# Patient Record
Sex: MALE | URBAN - METROPOLITAN AREA
[De-identification: names, ages, dates, MRNs, and addresses within clinical notes are randomized per-mention and may not be internally consistent; named-entity substitution may affect disease eponyms.]

---

## 2022-09-20 ENCOUNTER — OFFICE VISIT (OUTPATIENT)
Dept: URBAN - METROPOLITAN AREA TELEHEALTH 2 | Facility: TELEHEALTH | Age: 22
End: 2022-09-20
Payer: COMMERCIAL

## 2022-09-20 ENCOUNTER — OFFICE VISIT (OUTPATIENT)
Dept: URBAN - METROPOLITAN AREA CLINIC 96 | Facility: CLINIC | Age: 22
End: 2022-09-20

## 2022-09-20 DIAGNOSIS — Z79.899 LONG-TERM USE OF IMMUNOSUPPRESSANT MEDICATION: ICD-10-CM

## 2022-09-20 DIAGNOSIS — K50.00 CROHN'S DISEASE OF SMALL INTESTINE WITHOUT COMPLICATION: ICD-10-CM

## 2022-09-20 DIAGNOSIS — R21 RASH AND NONSPECIFIC SKIN ERUPTION: ICD-10-CM

## 2022-09-20 PROBLEM — 56689002 CROHN'S DISEASE OF SMALL INTESTINE: Status: ACTIVE | Noted: 2022-09-20

## 2022-09-20 PROCEDURE — 99204 OFFICE O/P NEW MOD 45 MIN: CPT | Performed by: INTERNAL MEDICINE

## 2022-09-20 NOTE — HPI-TODAY'S VISIT:
Chico Block is a 20 y/o with ileal CD, currently on Avsola (5mg/kg every 7 weeks). . Pt is referred by Dr. Mendoza. . Pt was dxd with CD in 2013.  He was started on Remicade (started in 2013) and then change to Avsola.  He was started on MTX due to some joint pain, given by Rheum (he uses autoinjector). Last colonoscopy in 2021. . Today on 9/20/2022, pt reports that he has no joint pain, no diarrhea, no weight loss, no n/v.  Some increase dandruff and itchy scalp.  No hair loss.

## 2022-11-11 ENCOUNTER — TELEPHONE ENCOUNTER (OUTPATIENT)
Dept: URBAN - METROPOLITAN AREA CLINIC 96 | Facility: CLINIC | Age: 22
End: 2022-11-11

## 2023-02-16 ENCOUNTER — TELEPHONE ENCOUNTER (OUTPATIENT)
Dept: URBAN - METROPOLITAN AREA CLINIC 96 | Facility: CLINIC | Age: 23
End: 2023-02-16

## 2023-04-06 ENCOUNTER — TELEPHONE ENCOUNTER (OUTPATIENT)
Dept: URBAN - METROPOLITAN AREA CLINIC 96 | Facility: CLINIC | Age: 23
End: 2023-04-06

## 2023-06-15 ENCOUNTER — WEB ENCOUNTER (OUTPATIENT)
Dept: URBAN - METROPOLITAN AREA CLINIC 96 | Facility: CLINIC | Age: 23
End: 2023-06-15

## 2023-07-19 ENCOUNTER — LAB OUTSIDE AN ENCOUNTER (OUTPATIENT)
Dept: URBAN - METROPOLITAN AREA CLINIC 96 | Facility: CLINIC | Age: 23
End: 2023-07-19

## 2023-07-19 ENCOUNTER — OFFICE VISIT (OUTPATIENT)
Dept: URBAN - METROPOLITAN AREA CLINIC 96 | Facility: CLINIC | Age: 23
End: 2023-07-19
Payer: COMMERCIAL

## 2023-07-19 ENCOUNTER — DASHBOARD ENCOUNTERS (OUTPATIENT)
Age: 23
End: 2023-07-19

## 2023-07-19 ENCOUNTER — WEB ENCOUNTER (OUTPATIENT)
Dept: URBAN - METROPOLITAN AREA CLINIC 96 | Facility: CLINIC | Age: 23
End: 2023-07-19

## 2023-07-19 VITALS
HEART RATE: 74 BPM | TEMPERATURE: 97.2 F | SYSTOLIC BLOOD PRESSURE: 111 MMHG | DIASTOLIC BLOOD PRESSURE: 74 MMHG | WEIGHT: 166.2 LBS | HEIGHT: 72 IN | BODY MASS INDEX: 22.51 KG/M2

## 2023-07-19 DIAGNOSIS — K50.80 CROHN'S COLITIS: ICD-10-CM

## 2023-07-19 DIAGNOSIS — R21 RASH AND NONSPECIFIC SKIN ERUPTION: ICD-10-CM

## 2023-07-19 PROCEDURE — 99214 OFFICE O/P EST MOD 30 MIN: CPT | Performed by: INTERNAL MEDICINE

## 2023-07-19 RX ORDER — INFLIXIMAB 100 MG/10ML
AS DIRECTED INJECTION, POWDER, LYOPHILIZED, FOR SOLUTION INTRAVENOUS
Qty: 100 | Refills: 0 | OUTPATIENT
Start: 2023-07-19 | End: 2023-08-18

## 2023-07-19 NOTE — PHYSICAL EXAM PSYCH:
normal mood with appropriate affect · Per history provided by son    Patient has had previous inpatient psychiatric stay in [place]

## 2023-07-19 NOTE — HPI-TODAY'S VISIT:
Chico Block is a 21 y/o with ileal CD, currently on Avsola (5mg/kg every 7 weeks). . Pt is referred by Dr. Mendoza. . Pt was dxd with CD in 2013.  He was started on Remicade (started in 2013) and then change to Avsola.  He was started on MTX due to some joint pain, given by Rheum (he uses autoinjector). Last colonoscopy in 2021. . Previously on 9/20/2022, pt reports that he has no joint pain, no diarrhea, no weight loss, no n/v.  Some increase dandruff and itchy scalp.  No hair loss. . Today on 7/19/2023, pt reports that he graduated from [employer], looking for a job.  Scalp issue is resolved with a new medication. Having some HA after going to the beach.

## 2023-08-11 LAB
A/G RATIO: 1.6
ABSOLUTE BASOPHILS: 42
ABSOLUTE EOSINOPHILS: 132
ABSOLUTE LYMPHOCYTES: 2718
ABSOLUTE MONOCYTES: 528
ABSOLUTE NEUTROPHILS: 2580
ALBUMIN: 4.9
ALKALINE PHOSPHATASE: 58
ALT (SGPT): 19
AST (SGOT): 17
BASOPHILS: 0.7
BILIRUBIN, TOTAL: 0.4
BUN/CREATININE RATIO: (no result)
BUN: 10
CALCIUM: 10
CARBON DIOXIDE, TOTAL: 24
CHLORIDE: 101
COMMENT: (no result)
CREATININE: 0.9
EGFR: 124
EOSINOPHILS: 2.2
GLOBULIN, TOTAL: 3.1
GLUCOSE: 92
HEMATOCRIT: 47.5
HEMOGLOBIN: 15.5
INFLIXIMAB AB, IBD: 22
INFLIXIMAB DRUG LEVEL: 9.1
INTERPRETATION: (no result)
LYMPHOCYTES: 45.3
MCH: 29.2
MCHC: 32.6
MCV: 89.6
MITOGEN-NIL: 9.57
MONOCYTES: 8.8
MPV: 9.5
NEUTROPHILS: 43
PLATELET COUNT: 331
POTASSIUM: 4.3
PROTEIN, TOTAL: 8
QUANTIFERON NIL VALUE: 0.04
QUANTIFERON TB1 AG VALUE: 0
QUANTIFERON TB2 AG VALUE: 0
QUANTIFERON-TB GOLD PLUS: NEGATIVE
RDW: 12.3
RED BLOOD CELL COUNT: 5.3
SODIUM: 142
WHITE BLOOD CELL COUNT: 6

## 2023-08-25 ENCOUNTER — WEB ENCOUNTER (OUTPATIENT)
Dept: URBAN - METROPOLITAN AREA SURGERY CENTER 18 | Facility: SURGERY CENTER | Age: 23
End: 2023-08-25

## 2023-08-31 ENCOUNTER — CLAIMS CREATED FROM THE CLAIM WINDOW (OUTPATIENT)
Dept: URBAN - METROPOLITAN AREA CLINIC 4 | Facility: CLINIC | Age: 23
End: 2023-08-31
Payer: COMMERCIAL

## 2023-08-31 ENCOUNTER — OFFICE VISIT (OUTPATIENT)
Dept: URBAN - METROPOLITAN AREA SURGERY CENTER 18 | Facility: SURGERY CENTER | Age: 23
End: 2023-08-31
Payer: COMMERCIAL

## 2023-08-31 DIAGNOSIS — K63.89 OTHER SPECIFIED DISEASES OF INTESTINE: ICD-10-CM

## 2023-08-31 DIAGNOSIS — K50.80 CROHN'S COLITIS: ICD-10-CM

## 2023-08-31 PROCEDURE — 88305 TISSUE EXAM BY PATHOLOGIST: CPT | Performed by: PATHOLOGY

## 2023-08-31 PROCEDURE — 45380 COLONOSCOPY AND BIOPSY: CPT | Performed by: INTERNAL MEDICINE

## 2023-08-31 PROCEDURE — G8907 PT DOC NO EVENTS ON DISCHARG: HCPCS | Performed by: INTERNAL MEDICINE

## 2024-01-05 ENCOUNTER — WEB ENCOUNTER (OUTPATIENT)
Dept: URBAN - METROPOLITAN AREA CLINIC 96 | Facility: CLINIC | Age: 24
End: 2024-01-05

## 2024-01-22 ENCOUNTER — WEB ENCOUNTER (OUTPATIENT)
Dept: URBAN - METROPOLITAN AREA CLINIC 96 | Facility: CLINIC | Age: 24
End: 2024-01-22

## 2024-01-25 ENCOUNTER — WEB ENCOUNTER (OUTPATIENT)
Dept: URBAN - METROPOLITAN AREA CLINIC 96 | Facility: CLINIC | Age: 24
End: 2024-01-25

## 2024-05-02 ENCOUNTER — LAB OUTSIDE AN ENCOUNTER (OUTPATIENT)
Dept: URBAN - METROPOLITAN AREA CLINIC 96 | Facility: CLINIC | Age: 24
End: 2024-05-02

## 2024-05-04 LAB
MITOGEN-NIL: 7.46
QUANTIFERON NIL VALUE: 0.03
QUANTIFERON TB1 AG VALUE: 0
QUANTIFERON TB2 AG VALUE: 0
QUANTIFERON-TB GOLD PLUS: NEGATIVE

## 2024-05-14 ENCOUNTER — TELEPHONE ENCOUNTER (OUTPATIENT)
Dept: URBAN - METROPOLITAN AREA CLINIC 97 | Facility: CLINIC | Age: 24
End: 2024-05-14

## 2024-05-24 ENCOUNTER — TELEPHONE ENCOUNTER (OUTPATIENT)
Dept: URBAN - METROPOLITAN AREA CLINIC 96 | Facility: CLINIC | Age: 24
End: 2024-05-24

## 2024-05-24 ENCOUNTER — TELEPHONE ENCOUNTER (OUTPATIENT)
Dept: URBAN - METROPOLITAN AREA CLINIC 6 | Facility: CLINIC | Age: 24
End: 2024-05-24

## 2024-07-24 ENCOUNTER — OFFICE VISIT (OUTPATIENT)
Dept: URBAN - METROPOLITAN AREA CLINIC 96 | Facility: CLINIC | Age: 24
End: 2024-07-24
Payer: COMMERCIAL

## 2024-07-24 ENCOUNTER — LAB OUTSIDE AN ENCOUNTER (OUTPATIENT)
Dept: URBAN - METROPOLITAN AREA CLINIC 96 | Facility: CLINIC | Age: 24
End: 2024-07-24

## 2024-07-24 VITALS
HEART RATE: 69 BPM | WEIGHT: 155 LBS | SYSTOLIC BLOOD PRESSURE: 108 MMHG | DIASTOLIC BLOOD PRESSURE: 75 MMHG | TEMPERATURE: 98.2 F | HEIGHT: 72 IN | RESPIRATION RATE: 18 BRPM | BODY MASS INDEX: 20.99 KG/M2

## 2024-07-24 DIAGNOSIS — Z91.89 COLON CANCER HIGH RISK: ICD-10-CM

## 2024-07-24 DIAGNOSIS — K50.00 CROHN'S DISEASE OF SMALL INTESTINE WITHOUT COMPLICATION: ICD-10-CM

## 2024-07-24 LAB
ABSOLUTE BASOPHIL COUNT: 0.04
ABSOLUTE EOSINOPHIL COUNT: 0.12
ABSOLUTE IMMATURE GRANULOCYTE  COUNT: 0.01
ABSOLUTE LYMPHOCYTE COUNT: 3.46
ABSOLUTE MONOCYTE COUNT: 0.56
ABSOLUTE NEUTROPHIL COUNT (ANC): 2.44
ABSOLUTE NRBC  COUNT: 0
AG RATIO: 1
ALBUMIN LEVEL: 4.6
ALK PHOS: 49
ALT: 16
ANION GAP: 5
AST: 16
BASOPHIL AUTO: 1
BILIRUBIN TOTAL: 0.5
BUN/CREAT RATIO: 9
BUN: 7
CALCIUM LEVEL: 9.6
CHLORIDE LEVEL: 107
CO2 LEVEL: 29
CREATININE LEVEL: 0.8
EOS AUTO: 2
GFR 2021: >60
GLUCOSE LEVEL: 87
HCT: 44
HGB: 15
IMMATURE GRANULOCYTES AUTO: 0.2
LYMPH AUTO: 52
MCH: 29.6
MCHC: 34.1
MCV: 87
MONO AUTO: 8
MPV: 8.9
NEUTRO AUTO: 37
NRBC AUTO: 0
OSMO (CALC): 279
PERFORMING LAB: (no result)
PLATELETS: 314
POTASSIUM LEVEL: 4.4
PROTEIN TOTAL: 8
RBC: 5.06
RDW: 12.1
SODIUM LEVEL: 141
WBC: 6.6

## 2024-07-24 PROCEDURE — 99214 OFFICE O/P EST MOD 30 MIN: CPT

## 2024-07-24 NOTE — HPI-TODAY'S VISIT:
Chico Block is a 21 y/o with ileal CD, currently on Avsola (5mg/kg every 7 weeks). . Pt is referred by Dr. Mendoza. . Pt was dxd with CD in 2013.  He was started on Remicade (started in 2013) and then change to Avsola.  He was started on MTX due to some joint pain, given by Rheum (he uses autoinjector). Last colonoscopy in 2021. . Previously on 9/20/2022, pt reports that he has no joint pain, no diarrhea, no weight loss, no n/v.  Some increase dandruff and itchy scalp.  No hair loss. . Previously on 7/19/2023, pt reports that he graduated from [employer], looking for a job.  Scalp issue is resolved with a new medication. Having some HA after going to the beach. . Today on 7/24/2024:  Patient is feeling well.  Getting hired at [employer] -  need medical records and documents signed.  BMs once or twice a day. Denies blood or mucus.  Denies abdominal pain.  Weight is stable. . Colonoscopy, 8/2023:  Normal cecum, ascending colon, hepatic flexure, transverse colon, splenic flexure, descending colon, sigmoid colon, rectosigmoid colon.  Patchy area of mildly erythematous mucosa in rectum. A.  Terminal ileum biopsy: No significant abnormality. B.  Rectum biopsy: Prominent mucosal lymphoid aggregates otherwise no significant abnormality.

## 2024-09-05 ENCOUNTER — TELEPHONE ENCOUNTER (OUTPATIENT)
Dept: URBAN - METROPOLITAN AREA CLINIC 50 | Facility: CLINIC | Age: 24
End: 2024-09-05

## 2025-05-07 ENCOUNTER — WEB ENCOUNTER (OUTPATIENT)
Dept: URBAN - METROPOLITAN AREA CLINIC 96 | Facility: CLINIC | Age: 25
End: 2025-05-07

## 2025-05-22 ENCOUNTER — OFFICE VISIT (OUTPATIENT)
Dept: URBAN - METROPOLITAN AREA CLINIC 96 | Facility: CLINIC | Age: 25
End: 2025-05-22
Payer: COMMERCIAL

## 2025-05-22 DIAGNOSIS — Z79.899 LONG-TERM USE OF IMMUNOSUPPRESSANT MEDICATION: ICD-10-CM

## 2025-05-22 DIAGNOSIS — R10.84 ABDOMINAL CRAMPING, GENERALIZED: ICD-10-CM

## 2025-05-22 DIAGNOSIS — K50.00 CROHN'S DISEASE OF SMALL INTESTINE WITHOUT COMPLICATION: ICD-10-CM

## 2025-05-22 DIAGNOSIS — R19.7 ACUTE DIARRHEA: ICD-10-CM

## 2025-05-22 DIAGNOSIS — Z91.89 COLON CANCER HIGH RISK: ICD-10-CM

## 2025-05-22 PROCEDURE — 99213 OFFICE O/P EST LOW 20 MIN: CPT

## 2025-05-22 RX ORDER — BROMPHENIRAMINE MALEATE, PSEUDOEPHEDRINE HYDROCHLORIDE AND DEXTROMETHORPHAN HYDROBROMIDE 2; 30; 10 MG/5ML; MG/5ML; MG/5ML
SYRUP ORAL
Qty: 120 MILLILITER | Status: ON HOLD | COMMUNITY

## 2025-05-22 RX ORDER — PREDNISONE 10 MG/1
3 TABLETS ONCE A DAY FOR 5 DAYS, 2 TABLETS ONCE A DAY FOR 5 DAYS, 1 TABLET ONCE A DAY FOR 5 DAYS TABLET ORAL
Qty: 30 TABLET | Refills: 0 | OUTPATIENT
Start: 2025-05-22

## 2025-05-22 NOTE — HPI-TODAY'S VISIT:
Chico Block is a 25 y/o with ileal CD, currently on Avsola (5mg/kg every 7 weeks). . Pt is referred by Dr. Mendoza. . Pt was dxd with CD in 2013.  He was started on Remicade (started in 2013) and then change to Avsola.  He was started on MTX due to some joint pain, given by Rheum (he uses autoinjector). Last colonoscopy in 2021. . Previously on 9/20/2022, pt reports that he has no joint pain, no diarrhea, no weight loss, no n/v.  Some increase dandruff and itchy scalp.  No hair loss. . Previously on 7/19/2023, pt reports that he graduated from [employer], looking for a job.  Scalp issue is resolved with a new medication. Having some HA after going to the beach. . Previously on 7/24/2024:  Patient is feeling well.  Getting hired at [employer] -  need medical records and documents signed.  BMs once or twice a day. Denies blood or mucus.  Denies abdominal pain.  Weight is stable. . Today on 5/22/2025: Covid in April. CD flare symptoms since then.  Eating carefully.  Stomach pain and diarrhea. 5 pound weight loss.  Mucus. No blood. No rash.  Finger joints hurt, elbow too.  Missing work due to symptoms - which is unusual for him. . Colonoscopy, 8/2023:  Normal cecum, ascending colon, hepatic flexure, transverse colon, splenic flexure, descending colon, sigmoid colon, rectosigmoid colon.  Patchy area of mildly erythematous mucosa in rectum. A.  Terminal ileum biopsy: No significant abnormality. B.  Rectum biopsy: Prominent mucosal lymphoid aggregates otherwise no significant abnormality.

## 2025-06-05 ENCOUNTER — TELEPHONE ENCOUNTER (OUTPATIENT)
Dept: URBAN - METROPOLITAN AREA CLINIC 96 | Facility: CLINIC | Age: 25
End: 2025-06-05

## 2025-06-06 ENCOUNTER — TELEPHONE ENCOUNTER (OUTPATIENT)
Dept: URBAN - METROPOLITAN AREA CLINIC 96 | Facility: CLINIC | Age: 25
End: 2025-06-06

## 2025-06-06 RX ORDER — METRONIDAZOLE 500 MG/1
1 TABLET TABLET, FILM COATED ORAL
Qty: 28 TABLET | Refills: 0 | OUTPATIENT
Start: 2025-06-06 | End: 2025-06-20

## 2025-06-06 RX ORDER — PREDNISONE 10 MG/1
3 TABLET TABLET ORAL ONCE A DAY
Qty: 90 TABLET | OUTPATIENT
Start: 2025-06-06

## 2025-06-09 ENCOUNTER — TELEPHONE ENCOUNTER (OUTPATIENT)
Dept: URBAN - METROPOLITAN AREA CLINIC 96 | Facility: CLINIC | Age: 25
End: 2025-06-09

## 2025-06-24 ENCOUNTER — OFFICE VISIT (OUTPATIENT)
Dept: URBAN - METROPOLITAN AREA TELEHEALTH 2 | Facility: TELEHEALTH | Age: 25
End: 2025-06-24
Payer: COMMERCIAL

## 2025-06-24 DIAGNOSIS — R19.7 DIARRHEA: ICD-10-CM

## 2025-06-24 DIAGNOSIS — K50.00 CROHN'S DISEASE OF SMALL INTESTINE WITHOUT COMPLICATION: ICD-10-CM

## 2025-06-24 DIAGNOSIS — R21 RASH AND NONSPECIFIC SKIN ERUPTION: ICD-10-CM

## 2025-06-24 DIAGNOSIS — Z91.89 COLON CANCER HIGH RISK: ICD-10-CM

## 2025-06-24 DIAGNOSIS — Z79.899 LONG-TERM USE OF IMMUNOSUPPRESSANT MEDICATION: ICD-10-CM

## 2025-06-24 PROCEDURE — 99215 OFFICE O/P EST HI 40 MIN: CPT | Performed by: INTERNAL MEDICINE

## 2025-06-24 RX ORDER — BROMPHENIRAMINE MALEATE, PSEUDOEPHEDRINE HYDROCHLORIDE AND DEXTROMETHORPHAN HYDROBROMIDE 2; 30; 10 MG/5ML; MG/5ML; MG/5ML
SYRUP ORAL
Qty: 120 MILLILITER | Status: ON HOLD | COMMUNITY

## 2025-06-24 RX ORDER — PREDNISONE 10 MG/1
3 TABLET TABLET ORAL ONCE A DAY
Qty: 90 TABLET | Status: ACTIVE | COMMUNITY
Start: 2025-06-06

## 2025-06-24 RX ORDER — PREDNISONE 10 MG/1
3 TABLETS ONCE A DAY FOR 5 DAYS, 2 TABLETS ONCE A DAY FOR 5 DAYS, 1 TABLET ONCE A DAY FOR 5 DAYS TABLET ORAL
Qty: 30 TABLET | Refills: 0 | Status: ON HOLD | COMMUNITY
Start: 2025-05-22

## 2025-06-24 NOTE — HPI-TODAY'S VISIT:
Pt Umair is a 25 y/o with ileal CD, currently on Avsola (5mg/kg every 7 weeks). . Pt is referred by Dr. Mendoza. . Pt was dxd with CD in 2013.  He was started on Remicade (started in 2013) and then change to Avsola.  He was started on MTX due to some joint pain, given by Rheum (he uses autoinjector). Last colonoscopy in 2021. . Previously on 9/20/2022, pt reports that he has no joint pain, no diarrhea, no weight loss, no n/v.  Some increase dandruff and itchy scalp.  No hair loss. . Previously on 7/19/2023, pt reports that he graduated from [employer], looking for a job.  Scalp issue is resolved with a new medication. Having some HA after going to the beach. . Previously on 7/24/2024:  Patient is feeling well.  Getting hired at [employer] -  need medical records and documents signed.  BMs once or twice a day. Denies blood or mucus.  Denies abdominal pain.  Weight is stable. . Prev on 5/22/2025: Covid in April. CD flare symptoms since then.  Eating carefully.  Stomach pain and diarrhea. 5 pound weight loss.  Mucus. No blood. No rash.  Finger joints hurt, elbow too.  Missing work due to symptoms - which is unusual for him. .  Today on 6/24/2025, pt is now normal, in terms of BM and weight.  Moved to [place] . . Colonoscopy, 8/2023:  Normal cecum, ascending colon, hepatic flexure, transverse colon, splenic flexure, descending colon, sigmoid colon, rectosigmoid colon.  Patchy area of mildly erythematous mucosa in rectum. A.  Terminal ileum biopsy: No significant abnormality. B.  Rectum biopsy: Prominent mucosal lymphoid aggregates otherwise no significant abnormality.

## 2025-06-27 ENCOUNTER — TELEPHONE ENCOUNTER (OUTPATIENT)
Dept: URBAN - METROPOLITAN AREA CLINIC 96 | Facility: CLINIC | Age: 25
End: 2025-06-27

## 2025-07-24 ENCOUNTER — OFFICE VISIT (OUTPATIENT)
Dept: URBAN - METROPOLITAN AREA CLINIC 96 | Facility: CLINIC | Age: 25
End: 2025-07-24